# Patient Record
Sex: FEMALE | Race: WHITE | NOT HISPANIC OR LATINO | Employment: FULL TIME | URBAN - METROPOLITAN AREA
[De-identification: names, ages, dates, MRNs, and addresses within clinical notes are randomized per-mention and may not be internally consistent; named-entity substitution may affect disease eponyms.]

---

## 2023-12-16 ENCOUNTER — APPOINTMENT (OUTPATIENT)
Dept: RADIOLOGY | Facility: HOSPITAL | Age: 28
End: 2023-12-16
Payer: COMMERCIAL

## 2023-12-16 ENCOUNTER — HOSPITAL ENCOUNTER (EMERGENCY)
Facility: HOSPITAL | Age: 28
Discharge: HOME | End: 2023-12-16
Attending: STUDENT IN AN ORGANIZED HEALTH CARE EDUCATION/TRAINING PROGRAM
Payer: COMMERCIAL

## 2023-12-16 VITALS
DIASTOLIC BLOOD PRESSURE: 73 MMHG | HEART RATE: 94 BPM | BODY MASS INDEX: 62.76 KG/M2 | RESPIRATION RATE: 16 BRPM | SYSTOLIC BLOOD PRESSURE: 147 MMHG | HEIGHT: 55 IN | TEMPERATURE: 98.1 F | WEIGHT: 271.17 LBS | OXYGEN SATURATION: 98 %

## 2023-12-16 DIAGNOSIS — Z04.1 EXAM FOLLOWING MVC (MOTOR VEHICLE COLLISION), NO APPARENT INJURY: Primary | ICD-10-CM

## 2023-12-16 PROCEDURE — 70450 CT HEAD/BRAIN W/O DYE: CPT | Performed by: RADIOLOGY

## 2023-12-16 PROCEDURE — 99285 EMERGENCY DEPT VISIT HI MDM: CPT | Mod: 25 | Performed by: STUDENT IN AN ORGANIZED HEALTH CARE EDUCATION/TRAINING PROGRAM

## 2023-12-16 PROCEDURE — 72125 CT NECK SPINE W/O DYE: CPT | Performed by: RADIOLOGY

## 2023-12-16 PROCEDURE — 70450 CT HEAD/BRAIN W/O DYE: CPT

## 2023-12-16 PROCEDURE — 72125 CT NECK SPINE W/O DYE: CPT

## 2023-12-16 ASSESSMENT — COLUMBIA-SUICIDE SEVERITY RATING SCALE - C-SSRS
1. IN THE PAST MONTH, HAVE YOU WISHED YOU WERE DEAD OR WISHED YOU COULD GO TO SLEEP AND NOT WAKE UP?: NO
6. HAVE YOU EVER DONE ANYTHING, STARTED TO DO ANYTHING, OR PREPARED TO DO ANYTHING TO END YOUR LIFE?: NO
2. HAVE YOU ACTUALLY HAD ANY THOUGHTS OF KILLING YOURSELF?: NO

## 2023-12-16 ASSESSMENT — PAIN SCALES - GENERAL: PAINLEVEL_OUTOF10: 4

## 2023-12-16 ASSESSMENT — PAIN DESCRIPTION - PAIN TYPE: TYPE: ACUTE PAIN

## 2023-12-16 ASSESSMENT — PAIN DESCRIPTION - LOCATION: LOCATION: HEAD

## 2023-12-16 ASSESSMENT — PAIN - FUNCTIONAL ASSESSMENT: PAIN_FUNCTIONAL_ASSESSMENT: 0-10
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
## 2025-05-03 ENCOUNTER — OFFICE VISIT (OUTPATIENT)
Dept: URGENT CARE | Age: 30
End: 2025-05-03
Payer: COMMERCIAL

## 2025-05-03 VITALS
SYSTOLIC BLOOD PRESSURE: 115 MMHG | HEART RATE: 103 BPM | BODY MASS INDEX: 48.03 KG/M2 | TEMPERATURE: 98.2 F | RESPIRATION RATE: 16 BRPM | HEIGHT: 63 IN | OXYGEN SATURATION: 97 % | DIASTOLIC BLOOD PRESSURE: 84 MMHG

## 2025-05-03 DIAGNOSIS — J01.90 ACUTE NON-RECURRENT SINUSITIS, UNSPECIFIED LOCATION: ICD-10-CM

## 2025-05-03 DIAGNOSIS — H10.33 ACUTE BACTERIAL CONJUNCTIVITIS OF BOTH EYES: Primary | ICD-10-CM

## 2025-05-03 RX ORDER — TOBRAMYCIN 3 MG/ML
1 SOLUTION/ DROPS OPHTHALMIC EVERY 4 HOURS
Qty: 5 ML | Refills: 0 | Status: SHIPPED | OUTPATIENT
Start: 2025-05-03 | End: 2025-05-13

## 2025-05-03 RX ORDER — AMOXICILLIN AND CLAVULANATE POTASSIUM 875; 125 MG/1; MG/1
875 TABLET, FILM COATED ORAL 2 TIMES DAILY
Qty: 20 TABLET | Refills: 0 | Status: SHIPPED | OUTPATIENT
Start: 2025-05-03

## 2025-05-03 RX ORDER — ALBUTEROL SULFATE 90 UG/1
2 INHALANT RESPIRATORY (INHALATION) EVERY 4 HOURS PRN
COMMUNITY
Start: 2024-09-16

## 2025-05-03 ASSESSMENT — ENCOUNTER SYMPTOMS
EYE REDNESS: 1
FEVER: 1
EYE DISCHARGE: 1

## 2025-05-03 NOTE — PROGRESS NOTES
"Subjective   Patient ID: Ashley Wharton is a 29 y.o. female. They present today with a chief complaint of Eye Problem (Feels like something in her eye, drainage x today ).    History of Present Illness  Patient is a 29 year old female presenting with an eye concern. She reports symptoms started a few days ago with earache after a concert. She then found out her dad who she lives with was sick and she started feeling sick a few days later. Had a fever the last 2 days that finally broke. Reports nasal congestion that is cloudy and yellow with streaks of blood. Today developed red eyes and purulent drainage. Feels like something is in her right eye.       History provided by:  Patient   used: No    Eye Problem  Associated symptoms: discharge and redness        Past Medical History  Allergies as of 05/03/2025 - Reviewed 05/03/2025   Allergen Reaction Noted    Bactrim [sulfamethoxazole-trimethoprim] Hives 12/16/2023    Sulfa (sulfonamide antibiotics) Rash 12/16/2023       Prescriptions Prior to Admission[1]     Medical History[2]    Surgical History[3]     reports that she has never smoked. She has never used smokeless tobacco.    Review of Systems  Review of Systems   Constitutional:  Positive for fever.   HENT:  Positive for congestion and ear pain.    Eyes:  Positive for discharge and redness.                                  Objective    Vitals:    05/03/25 1730   BP: 115/84   Pulse: 103   Resp: 16   Temp: 36.8 °C (98.2 °F)   TempSrc: Oral   SpO2: 97%   Height: 1.6 m (5' 3\")     No LMP recorded.    Physical Exam  Constitutional:       General: She is not in acute distress.     Appearance: Normal appearance. She is obese. She is not ill-appearing, toxic-appearing or diaphoretic.   HENT:      Head: Normocephalic. No right periorbital erythema or left periorbital erythema.      Jaw: There is normal jaw occlusion. No trismus.      Right Ear: Tympanic membrane, ear canal and external ear normal. " There is no impacted cerumen.      Left Ear: Tympanic membrane, ear canal and external ear normal. There is no impacted cerumen.      Nose: Mucosal edema present.      Mouth/Throat:      Mouth: Mucous membranes are moist.      Pharynx: Oropharynx is clear. Uvula midline. No pharyngeal swelling, oropharyngeal exudate, posterior oropharyngeal erythema, uvula swelling or postnasal drip.      Tonsils: No tonsillar exudate or tonsillar abscesses.   Eyes:      General: Lids are normal. No scleral icterus.        Right eye: No discharge.         Left eye: No discharge.      Extraocular Movements:      Right eye: Normal extraocular motion.      Left eye: Normal extraocular motion.      Conjunctiva/sclera:      Right eye: Right conjunctiva is injected.      Left eye: Left conjunctiva is injected.      Pupils: Pupils are equal, round, and reactive to light.      Right eye: Pupil is round and reactive. No corneal abrasion or fluorescein uptake.      Slit lamp exam:     Right eye: No corneal ulcer or hypopyon.      Comments: Purulent drainage from both eyes  No periorbital edema or erythema    Neck:      Trachea: Phonation normal.   Cardiovascular:      Rate and Rhythm: Normal rate and regular rhythm.      Heart sounds: No murmur heard.     No friction rub. No gallop.   Pulmonary:      Effort: Pulmonary effort is normal. No respiratory distress.      Breath sounds: Normal breath sounds. No stridor. No wheezing, rhonchi or rales.   Neurological:      Mental Status: She is alert.   Psychiatric:         Mood and Affect: Mood normal.         Behavior: Behavior normal.         Thought Content: Thought content normal.         Procedures    Point of Care Test & Imaging Results from this visit  No results found for this visit on 05/03/25.   Imaging  No results found.    Cardiology, Vascular, and Other Imaging  No other imaging results found for the past 2 days      Diagnostic study results (if any) were reviewed by Radha Salas  [place].    Assessment/Plan   Allergies, medications, history, and pertinent labs/EKGs/Imaging reviewed by Radha Salas PA-C.     Medical Decision Making  Patient is a 29 year old female presenting for evaluation of a sinus issue and eye problem. Hemodynamically stable. Exam with bilateral conjunctival injection and purulent drainage from eyes. Nasal mucosa is inflamed and swollen. History and exam concerning for bacterial conjunctivitis and sinusitis. Treatment as below. Discussed supportive care, OTC medications, warm compresses. Return or PCP if no improvement.     At time of discharge, patient was clinically well-appearing and appropriate for outpatient management. The patient/parent/guardian was educated regarding diagnosis, supportive care, OTC and Rx medications. The patient/parent/guardian was given the opportunity to ask questions prior to discharge. They verbalized understanding of discussion of treatment plan, expected course of illness and/or injury, indications on when to return to , when to seek further evaluation in ED/call 911, and the need to follow up with PCP and/or specialist as referred. Patient/parent/guardian was provided with work/school documentation if requested. Patient stable upon discharge.     Orders and Diagnoses  Diagnoses and all orders for this visit:  Acute bacterial conjunctivitis of both eyes  -     tobramycin (Tobrex) 0.3 % ophthalmic solution; Administer 1 drop into both eyes every 4 hours for 10 days.  Acute non-recurrent sinusitis, unspecified location  -     amoxicillin-clavulanate (Augmentin) 875-125 mg tablet; Take 1 tablet (875 mg) by mouth 2 times a day.      Medical Admin Record      Patient disposition: Home    Electronically signed by Radha Salas PA-C  6:09 PM           [1] (Not in a hospital admission)   [2]   Past Medical History:  Diagnosis Date    Influenza due to other identified influenza virus with other respiratory manifestations 01/09/2018     Influenza A    Personal history of other diseases of the respiratory system     History of asthma   [3]   Past Surgical History:  Procedure Laterality Date    OTHER SURGICAL HISTORY  02/15/2017    Dental Surgery    TONSILLECTOMY  02/15/2017    Tonsillectomy